# Patient Record
Sex: FEMALE | Race: WHITE | NOT HISPANIC OR LATINO | URBAN - METROPOLITAN AREA
[De-identification: names, ages, dates, MRNs, and addresses within clinical notes are randomized per-mention and may not be internally consistent; named-entity substitution may affect disease eponyms.]

---

## 2018-10-26 ENCOUNTER — NEW PATIENT (OUTPATIENT)
Dept: URBAN - METROPOLITAN AREA CLINIC 24 | Facility: CLINIC | Age: 68
End: 2018-10-26
Payer: MEDICARE

## 2018-10-26 DIAGNOSIS — H52.4 PRESBYOPIA: ICD-10-CM

## 2018-10-26 PROCEDURE — 92004 COMPRE OPH EXAM NEW PT 1/>: CPT | Performed by: OPTOMETRIST

## 2018-10-26 PROCEDURE — 92134 CPTRZ OPH DX IMG PST SGM RTA: CPT | Performed by: OPTOMETRIST

## 2018-10-26 ASSESSMENT — INTRAOCULAR PRESSURE
OD: 12
OS: 13

## 2018-10-26 ASSESSMENT — KERATOMETRY
OD: 41.74
OS: 41.49

## 2018-10-26 ASSESSMENT — VISUAL ACUITY
OS: 20/25
OD: 20/20

## 2021-02-02 ENCOUNTER — FOLLOW UP ESTABLISHED (OUTPATIENT)
Dept: URBAN - METROPOLITAN AREA CLINIC 24 | Facility: CLINIC | Age: 71
End: 2021-02-02
Payer: MEDICARE

## 2021-02-02 DIAGNOSIS — H25.813 COMBINED FORMS OF AGE-RELATED CATARACT, BILATERAL: Primary | ICD-10-CM

## 2021-02-02 PROCEDURE — 92250 FUNDUS PHOTOGRAPHY W/I&R: CPT | Performed by: OPTOMETRIST

## 2021-02-02 PROCEDURE — 99214 OFFICE O/P EST MOD 30 MIN: CPT | Performed by: OPTOMETRIST

## 2021-02-02 ASSESSMENT — KERATOMETRY
OS: 41.41
OD: 41.77

## 2021-02-02 ASSESSMENT — VISUAL ACUITY
OS: 20/60
OD: 20/20

## 2021-02-02 ASSESSMENT — INTRAOCULAR PRESSURE
OS: 12
OD: 12

## 2021-02-26 ENCOUNTER — ADULT PHYSICAL (OUTPATIENT)
Dept: URBAN - METROPOLITAN AREA CLINIC 24 | Facility: CLINIC | Age: 71
End: 2021-02-26
Payer: MEDICARE

## 2021-02-26 PROCEDURE — 99203 OFFICE O/P NEW LOW 30 MIN: CPT | Performed by: PHYSICIAN ASSISTANT

## 2021-03-01 ENCOUNTER — NEW PATIENT (OUTPATIENT)
Dept: URBAN - METROPOLITAN AREA CLINIC 24 | Facility: CLINIC | Age: 71
End: 2021-03-01
Payer: MEDICARE

## 2021-03-01 DIAGNOSIS — H25.12 AGE-RELATED NUCLEAR CATARACT, LEFT EYE: ICD-10-CM

## 2021-03-01 DIAGNOSIS — H25.11 AGE-RELATED NUCLEAR CATARACT, RIGHT EYE: ICD-10-CM

## 2021-03-01 DIAGNOSIS — H52.223 REGULAR ASTIGMATISM, BILATERAL: ICD-10-CM

## 2021-03-01 PROCEDURE — 99204 OFFICE O/P NEW MOD 45 MIN: CPT | Performed by: OPHTHALMOLOGY

## 2021-03-08 ENCOUNTER — SURGERY (OUTPATIENT)
Dept: URBAN - METROPOLITAN AREA SURGERY 12 | Facility: SURGERY | Age: 71
End: 2021-03-08
Payer: MEDICARE

## 2021-03-08 PROCEDURE — 66984 XCAPSL CTRC RMVL W/O ECP: CPT | Performed by: OPHTHALMOLOGY

## 2021-03-09 ENCOUNTER — POST OP (OUTPATIENT)
Dept: URBAN - METROPOLITAN AREA CLINIC 24 | Facility: CLINIC | Age: 71
End: 2021-03-09
Payer: MEDICARE

## 2021-03-09 DIAGNOSIS — Z96.1 PRESENCE OF INTRAOCULAR LENS: Primary | ICD-10-CM

## 2021-03-09 PROCEDURE — 99024 POSTOP FOLLOW-UP VISIT: CPT | Performed by: OPTOMETRIST

## 2021-03-09 ASSESSMENT — INTRAOCULAR PRESSURE: OS: 17

## 2021-04-02 ENCOUNTER — POST-OPERATIVE VISIT (OUTPATIENT)
Dept: URBAN - METROPOLITAN AREA CLINIC 24 | Facility: CLINIC | Age: 71
End: 2021-04-02
Payer: MEDICARE

## 2021-04-02 PROCEDURE — 99024 POSTOP FOLLOW-UP VISIT: CPT | Performed by: OPTOMETRIST

## 2021-04-02 ASSESSMENT — VISUAL ACUITY
OS: 20/20
OD: 20/20

## 2021-04-02 ASSESSMENT — INTRAOCULAR PRESSURE
OD: 16
OS: 20

## 2021-04-02 NOTE — IMPRESSION/PLAN
Impression: S/P Cataract Extraction by phacoemulsification with IOL placement, Lensx, ORA. LIRI OS - 25 Days. Encounter for surgical aftercare following surgery on a sense organ  Z48.810. Plan: Pt is happy with outcome OS and declines new srx today.

## 2021-08-06 ENCOUNTER — OFFICE VISIT (OUTPATIENT)
Dept: URBAN - METROPOLITAN AREA CLINIC 24 | Facility: CLINIC | Age: 71
End: 2021-08-06
Payer: MEDICARE

## 2021-08-06 PROCEDURE — 92250 FUNDUS PHOTOGRAPHY W/I&R: CPT | Performed by: OPTOMETRIST

## 2021-08-06 PROCEDURE — 99214 OFFICE O/P EST MOD 30 MIN: CPT | Performed by: OPTOMETRIST

## 2021-08-06 PROCEDURE — 92134 CPTRZ OPH DX IMG PST SGM RTA: CPT | Performed by: OPTOMETRIST

## 2021-08-06 ASSESSMENT — INTRAOCULAR PRESSURE
OD: 9
OS: 10

## 2021-08-06 NOTE — IMPRESSION/PLAN
Impression: Vitreous degeneration, left eye: H43.812. Plan: No vitreous cells, retinal tears/holes, or detachments observed today. Discussed difference between si/sx RD vs. si/sx of Dexycu. Lengthy discussion concerning Dexycu, WILL dissolve with time. However, pt reports she cannot live with her eye this way and would like to schedule consultation with retina to discuss surgical options.

## 2021-08-30 ENCOUNTER — OFFICE VISIT (OUTPATIENT)
Dept: URBAN - METROPOLITAN AREA CLINIC 24 | Facility: CLINIC | Age: 71
End: 2021-08-30
Payer: MEDICARE

## 2021-08-30 PROCEDURE — 92134 CPTRZ OPH DX IMG PST SGM RTA: CPT | Performed by: OPHTHALMOLOGY

## 2021-08-30 PROCEDURE — 99214 OFFICE O/P EST MOD 30 MIN: CPT | Performed by: OPHTHALMOLOGY

## 2021-08-30 ASSESSMENT — INTRAOCULAR PRESSURE
OD: 13
OS: 17

## 2021-08-30 NOTE — IMPRESSION/PLAN
Impression: Vitreous degeneration, left Dexycu and floaters Plan:  si/sx of Dexycu. THICK debris / floaters -- WORSE after Ce/IOL -- Dexycu and floaters lengthy discussion concerning Dexycu, WILL dissolve with time reassured pt but now 5 MONTHS after surgery, pt reports she cannot live with her eye this way and would like to remove - PLAN VIT / Laser OS n. avail.   Possible surgeon credit (ADD 11/17 -- Dr. Leeann Maria D/w'd Dr. Linda Rogers to review)

## 2021-08-30 NOTE — IMPRESSION/PLAN
Impression: Regular astigmatism, bilateral Plan:  LRI  -- PCIOL and all gen eye care w [facility] eye team.   May need surgeon credit. Pt frustrated w copays / deductibles assoc'd w another surgery.

## 2021-11-04 ENCOUNTER — OFFICE VISIT (OUTPATIENT)
Dept: URBAN - METROPOLITAN AREA CLINIC 24 | Facility: CLINIC | Age: 71
End: 2021-11-04
Payer: MEDICARE

## 2021-11-04 PROCEDURE — 92285 EXTERNAL OCULAR PHOTOGRAPHY: CPT | Performed by: OPHTHALMOLOGY

## 2021-11-04 PROCEDURE — 99214 OFFICE O/P EST MOD 30 MIN: CPT | Performed by: OPHTHALMOLOGY

## 2021-11-04 NOTE — IMPRESSION/PLAN
Impression: Neoplasm of uncertain behavior of connective and other soft tissue: D48.1. Plan: Patient to return in 2-3 weeks for Excision of neoplasm of the RLL, pending authorization from insurance.

## 2021-11-17 ENCOUNTER — ADULT PHYSICAL (OUTPATIENT)
Dept: URBAN - METROPOLITAN AREA CLINIC 24 | Facility: CLINIC | Age: 71
End: 2021-11-17
Payer: MEDICARE

## 2021-11-17 DIAGNOSIS — Z01.818 ENCOUNTER FOR OTHER PREPROCEDURAL EXAMINATION: Primary | ICD-10-CM

## 2021-11-17 PROCEDURE — 99213 OFFICE O/P EST LOW 20 MIN: CPT | Performed by: PHYSICIAN ASSISTANT

## 2021-11-17 RX ORDER — VITAMIN B COMPLEX
CAPSULE ORAL AS NEEDED
Qty: 0 | Refills: 0 | Status: ACTIVE
Start: 2021-11-17

## 2021-11-19 ENCOUNTER — SURGERY (OUTPATIENT)
Dept: URBAN - METROPOLITAN AREA SURGERY 12 | Facility: SURGERY | Age: 71
End: 2021-11-19
Payer: MEDICARE

## 2021-11-19 PROCEDURE — 67039 LASER TREATMENT OF RETINA: CPT | Performed by: OPHTHALMOLOGY

## 2021-11-20 ENCOUNTER — POST-OPERATIVE VISIT (OUTPATIENT)
Dept: URBAN - METROPOLITAN AREA CLINIC 10 | Facility: CLINIC | Age: 71
End: 2021-11-20

## 2021-11-20 DIAGNOSIS — Z48.810 ENCOUNTER FOR SURGICAL AFTERCARE FOLLOWING SURGERY ON A SENSE ORGAN: Primary | ICD-10-CM

## 2021-11-20 PROCEDURE — 99024 POSTOP FOLLOW-UP VISIT: CPT | Performed by: OPTOMETRIST

## 2021-11-20 ASSESSMENT — INTRAOCULAR PRESSURE: OS: 15

## 2021-11-20 NOTE — IMPRESSION/PLAN
Impression: S/P Posterior Vitrectomy (PPVIT)/laser/capsulotomy OS - 1 Day. Encounter for surgical aftercare following surgery on a sense organ  Z48.810. Plan: Begin ofloxacin tid x 1 wk, pred acetate tid x 1 wk then taper weekly. RTC as scheduled c Dr. Donna Gray.

## 2021-12-01 ENCOUNTER — OFFICE VISIT (OUTPATIENT)
Dept: URBAN - METROPOLITAN AREA CLINIC 24 | Facility: CLINIC | Age: 71
End: 2021-12-01
Payer: MEDICARE

## 2021-12-01 PROCEDURE — 92134 CPTRZ OPH DX IMG PST SGM RTA: CPT | Performed by: OPHTHALMOLOGY

## 2021-12-01 PROCEDURE — 99024 POSTOP FOLLOW-UP VISIT: CPT | Performed by: OPHTHALMOLOGY

## 2021-12-01 ASSESSMENT — INTRAOCULAR PRESSURE
OS: 17
OD: 14

## 2021-12-01 NOTE — IMPRESSION/PLAN
Impression: Vitreous degeneration, left Dexycu  -CLEAR s/p VIT '21 Plan: si/sx of Dexycu. THICK debris / floaters -- WORSE after Ce/IOL -- Dexycu and floaters lengthy discussion concerning Dexycu, WILL dissolve with time reassured pt but now 5 MONTHS after surgery, pt reports she cannot live with her eye this way and would like to remove -  DONE w Nov '21` Lynda  VIT / Laser OS Pos Surgeon Cr. See Sahara Wilkes notes.    RETINA PRN /  F/u Sahara Wilkes et al 1m

## 2021-12-02 ENCOUNTER — PROCEDURE (OUTPATIENT)
Dept: URBAN - METROPOLITAN AREA CLINIC 24 | Facility: CLINIC | Age: 71
End: 2021-12-02
Payer: MEDICARE

## 2021-12-02 DIAGNOSIS — D48.1 NEOPLASM OF UNCERTAIN BEHAVIOR OF CONNECTIVE AND OTHER SOFT TISSUE: Primary | ICD-10-CM

## 2021-12-02 PROCEDURE — 11440 EXC FACE-MM B9+MARG 0.5 CM/<: CPT | Performed by: OPHTHALMOLOGY

## 2021-12-02 RX ORDER — NEOMYCIN SULFATE, POLYMYXIN B SULFATE AND DEXAMETHASONE 3.5; 10000; 1 MG/G; [USP'U]/G; MG/G
OINTMENT OPHTHALMIC
Qty: 1 | Refills: 1 | Status: INACTIVE
Start: 2021-12-02 | End: 2021-12-02

## 2022-02-16 ENCOUNTER — OFFICE VISIT (OUTPATIENT)
Dept: URBAN - METROPOLITAN AREA CLINIC 24 | Facility: CLINIC | Age: 72
End: 2022-02-16
Payer: MEDICARE

## 2022-02-16 DIAGNOSIS — H04.123 TEAR FILM INSUFFICIENCY OF BILATERAL LACRIMAL GLANDS: ICD-10-CM

## 2022-02-16 DIAGNOSIS — H25.811 COMBINED FORMS OF AGE-RELATED CATARACT, RIGHT EYE: Primary | ICD-10-CM

## 2022-02-16 PROCEDURE — 92014 COMPRE OPH EXAM EST PT 1/>: CPT | Performed by: STUDENT IN AN ORGANIZED HEALTH CARE EDUCATION/TRAINING PROGRAM

## 2022-02-16 PROCEDURE — 92134 CPTRZ OPH DX IMG PST SGM RTA: CPT | Performed by: STUDENT IN AN ORGANIZED HEALTH CARE EDUCATION/TRAINING PROGRAM

## 2022-02-16 PROCEDURE — 92250 FUNDUS PHOTOGRAPHY W/I&R: CPT | Performed by: STUDENT IN AN ORGANIZED HEALTH CARE EDUCATION/TRAINING PROGRAM

## 2022-02-16 ASSESSMENT — VISUAL ACUITY
OS: 20/20
OD: 20/30

## 2022-02-16 ASSESSMENT — KERATOMETRY
OS: 41.59
OD: 41.72

## 2022-02-16 ASSESSMENT — INTRAOCULAR PRESSURE
OD: 15
OS: 15

## 2022-02-16 NOTE — IMPRESSION/PLAN
Impression: Combined forms of age-related cataract, right eye: H25.811. Plan: Discussed cataracts with patient. Discussed treatment options. Surgical treatment is recommended d/t asymmetry of vision and color/depth perception. Surgical risks and benefits discussed. Patient elects surgical treatment. Recommend surgery OD. Patient is candidate/interested in multifocal, toric, standard, LenSx and ORA.

## 2022-02-16 NOTE — IMPRESSION/PLAN
Impression: Tear film insufficiency of bilateral lacrimal glands, MGD Plan: Cont art tears 2-3x/day OU prn

## 2022-03-07 ENCOUNTER — ADULT PHYSICAL (OUTPATIENT)
Dept: URBAN - METROPOLITAN AREA CLINIC 24 | Facility: CLINIC | Age: 72
End: 2022-03-07
Payer: MEDICARE

## 2022-03-07 PROCEDURE — 99213 OFFICE O/P EST LOW 20 MIN: CPT | Performed by: PHYSICIAN ASSISTANT

## 2022-03-07 ASSESSMENT — PACHYMETRY
OS: 4.54
OD: 24.26
OS: 24.27
OD: 2.92

## 2022-03-09 ENCOUNTER — PRE-OPERATIVE VISIT (OUTPATIENT)
Dept: URBAN - METROPOLITAN AREA CLINIC 24 | Facility: CLINIC | Age: 72
End: 2022-03-09
Payer: MEDICARE

## 2022-03-09 DIAGNOSIS — H43.812 VITREOUS DEGENERATION, LEFT EYE: ICD-10-CM

## 2022-03-09 DIAGNOSIS — H52.221 REGULAR ASTIGMATISM, RIGHT EYE: ICD-10-CM

## 2022-03-09 PROCEDURE — 99214 OFFICE O/P EST MOD 30 MIN: CPT | Performed by: OPHTHALMOLOGY

## 2022-03-09 RX ORDER — DICLOFENAC SODIUM 1 MG/ML
0.1 % SOLUTION/ DROPS OPHTHALMIC
Qty: 5 | Refills: 1 | Status: ACTIVE
Start: 2022-03-09

## 2022-03-09 RX ORDER — KETOROLAC TROMETHAMINE 5 MG/ML
0.5 % SOLUTION OPHTHALMIC
Qty: 5 | Refills: 1 | Status: ACTIVE
Start: 2022-03-09

## 2022-03-09 RX ORDER — PREDNISOLONE ACETATE 10 MG/ML
1 % SUSPENSION/ DROPS OPHTHALMIC
Qty: 1 | Refills: 2 | Status: ACTIVE
Start: 2022-03-09

## 2022-03-09 ASSESSMENT — INTRAOCULAR PRESSURE
OS: 14
OD: 16

## 2022-03-09 NOTE — IMPRESSION/PLAN
Impression: Vitreous degeneration, left eye Plan: Undilated exam. There is no evidence of retinal pathology. All signs and risks of retinal detachment and tears were discussed in detail. Patient instructed to call the office immediately if any symptoms noted. Recommend the patient return to office for follow up. Discussed with patient, understands this may limit vision after surgery.

## 2022-03-09 NOTE — IMPRESSION/PLAN
Impression: Combined forms of age-related cataract, right eye: H25.811. Plan: Discussed cataract diagnosis with the patient. Discussed risks, benefits and alternatives to surgery including but not limited to: bleeding, infection, risk of vision loss, loss of the eye, need for other surgery. Patient voiced understanding and wishes to proceed. Patient desires surgery  2nd eye OD.(( VIVITY IOL OD, AIM -  0 .25 OD: no DEXYCU, drops only, moxi, LENSX/ORA OU, no LRI OD min cyl. )) Discussed with pt NO PERFECT IOL and understands limitations of IOL ( Halos/Glare/Dim lights) as well as neuroadaptation period with any IOL. Discussed with patient may still need glasses for bcva and need for more surgery such as Lasik/PRK.

## 2022-03-18 ENCOUNTER — SURGERY (OUTPATIENT)
Dept: URBAN - METROPOLITAN AREA SURGERY 12 | Facility: SURGERY | Age: 72
End: 2022-03-18
Payer: MEDICARE

## 2022-03-18 PROCEDURE — 66984 XCAPSL CTRC RMVL W/O ECP: CPT | Performed by: OPHTHALMOLOGY

## 2022-03-18 PROCEDURE — PR1FY PR1FY: CUSTOM | Performed by: OPHTHALMOLOGY

## 2022-03-19 ENCOUNTER — POST-OPERATIVE VISIT (OUTPATIENT)
Dept: URBAN - METROPOLITAN AREA CLINIC 10 | Facility: CLINIC | Age: 72
End: 2022-03-19

## 2022-03-19 PROCEDURE — 99024 POSTOP FOLLOW-UP VISIT: CPT | Performed by: OPTOMETRIST

## 2022-03-19 ASSESSMENT — INTRAOCULAR PRESSURE: OD: 14

## 2022-03-19 NOTE — IMPRESSION/PLAN
Impression: S/P Cataract Extraction by phacoemulsification with IOL placement; Lensx/ORA OD - 1 Day. Presence of intraocular lens  Z96.1.  Plan: pt doing well, return for next post op

## 2022-04-15 ENCOUNTER — POST-OPERATIVE VISIT (OUTPATIENT)
Dept: URBAN - METROPOLITAN AREA CLINIC 24 | Facility: CLINIC | Age: 72
End: 2022-04-15
Payer: MEDICARE

## 2022-04-15 DIAGNOSIS — Z48.810 ENCOUNTER FOR SURGICAL AFTERCARE FOLLOWING SURGERY ON A SENSE ORGAN: Primary | ICD-10-CM

## 2022-04-15 PROCEDURE — 99024 POSTOP FOLLOW-UP VISIT: CPT | Performed by: STUDENT IN AN ORGANIZED HEALTH CARE EDUCATION/TRAINING PROGRAM

## 2022-04-15 NOTE — IMPRESSION/PLAN
Impression: S/P Cataract Extraction by phacoemulsification with IOL placement; Lensx/ORA OD - 28 Days. Encounter for surgical aftercare following surgery on a sense organ  Z48.810.  Excellent post op course Plan: Pt satisfied with vision at distance and OTC readers for near

## 2023-05-01 ENCOUNTER — OFFICE VISIT (OUTPATIENT)
Dept: URBAN - METROPOLITAN AREA CLINIC 24 | Facility: CLINIC | Age: 73
End: 2023-05-01
Payer: MEDICARE

## 2023-05-01 DIAGNOSIS — H43.813 VITREOUS DEGENERATION, BILATERAL: Primary | ICD-10-CM

## 2023-05-01 PROCEDURE — 92134 CPTRZ OPH DX IMG PST SGM RTA: CPT | Performed by: OPTOMETRIST

## 2023-05-01 PROCEDURE — 99213 OFFICE O/P EST LOW 20 MIN: CPT | Performed by: OPTOMETRIST

## 2023-05-01 ASSESSMENT — INTRAOCULAR PRESSURE
OS: 16
OD: 13

## 2023-05-01 NOTE — IMPRESSION/PLAN
Impression: Vitreous degeneration, bilateral: H43.813.
-- acute onset OD x < 1 week
-- s/p PPVIT (dexcyu) OS Plan: PVD accounts for pt's complaints. There is no evidence of retinal pathology. All signs and risks of retinal detachment or tears were discussed in detail. If pt. notices any symptoms discussed, contact office ASAP. Recommend pt. return for recheck 3-4 weeks. 
-- good surgical outcome OS

## 2023-06-12 ENCOUNTER — OFFICE VISIT (OUTPATIENT)
Dept: URBAN - METROPOLITAN AREA CLINIC 24 | Facility: CLINIC | Age: 73
End: 2023-06-12
Payer: MEDICARE

## 2023-06-12 DIAGNOSIS — H43.813 VITREOUS DEGENERATION, BILATERAL: Primary | ICD-10-CM

## 2023-06-12 DIAGNOSIS — Z96.1 PRESENCE OF PSEUDOPHAKIA: ICD-10-CM

## 2023-06-12 PROCEDURE — 92134 CPTRZ OPH DX IMG PST SGM RTA: CPT | Performed by: OPTOMETRIST

## 2023-06-12 PROCEDURE — 92014 COMPRE OPH EXAM EST PT 1/>: CPT | Performed by: OPTOMETRIST

## 2023-06-12 ASSESSMENT — KERATOMETRY
OD: 41.74
OS: 41.56

## 2023-06-12 ASSESSMENT — INTRAOCULAR PRESSURE
OD: 12
OS: 14

## 2023-06-12 ASSESSMENT — VISUAL ACUITY
OS: 20/20
OD: 20/20

## 2023-06-12 NOTE — IMPRESSION/PLAN
Impression: Vitreous degeneration, bilateral: H43.813.
-- acute onset Apr '23
-- s/p PPVIT (dexcyu) OS Plan: PVD accounts for pt's complaints. There is no evidence of retinal pathology. All signs and risks of retinal detachment or tears were discussed in detail. If pt. notices any symptoms discussed, contact office ASAP. -- symptoms improving.